# Patient Record
Sex: MALE | Race: OTHER | HISPANIC OR LATINO
[De-identification: names, ages, dates, MRNs, and addresses within clinical notes are randomized per-mention and may not be internally consistent; named-entity substitution may affect disease eponyms.]

---

## 2019-11-08 PROBLEM — Z00.00 ENCOUNTER FOR PREVENTIVE HEALTH EXAMINATION: Status: ACTIVE | Noted: 2019-11-08

## 2019-11-15 ENCOUNTER — APPOINTMENT (OUTPATIENT)
Dept: SURGERY | Facility: CLINIC | Age: 49
End: 2019-11-15
Payer: COMMERCIAL

## 2019-11-15 VITALS
TEMPERATURE: 96.8 F | HEART RATE: 84 BPM | BODY MASS INDEX: 31.18 KG/M2 | SYSTOLIC BLOOD PRESSURE: 143 MMHG | WEIGHT: 194 LBS | OXYGEN SATURATION: 98 % | DIASTOLIC BLOOD PRESSURE: 93 MMHG | HEIGHT: 66 IN

## 2019-11-15 PROCEDURE — 99203 OFFICE O/P NEW LOW 30 MIN: CPT

## 2019-11-15 NOTE — HISTORY OF PRESENT ILLNESS
[de-identified] : 50 y/o M with Hx of arthritis, HLD and HTN with PSHx of deviated septum repair, presents here for initial evaluation of a lipoma. He reports here feeling well. He states that there is a lipoma to his Right side found during an US. He denies any pain associated with the lump and he is unsure if it has grown in size. He has noticed it for more than a year now. He states that he only experiences discomfort when he is laying on his right side

## 2019-11-15 NOTE — ADDENDUM
[FreeTextEntry1] : Documented by Bridget Cosme acting as a scribe for Dr. Richard Paiz on 11/15/2019\par

## 2019-11-15 NOTE — ASSESSMENT
[FreeTextEntry1] : 48 y/o M with Hx of arthritis, HLD and HTN with PSHx of deviated septum repair, with lipoma to right flank. Patient was recommended with excision of lipoma. The surgical approach, risks, benefits, and alternatives to the proposed procedure were discussed with the patient and all questions were answered to their satisfaction. Pre and Post-operative instructions were provided to the patient. Patient understands and agrees to undergo the proposed procedure. Will schedule for this. \par

## 2019-11-15 NOTE — END OF VISIT
[FreeTextEntry3] : All medical record entries made by the Scribe were at my, Dr. Paiz's direction and personally dictated by me on 11/15/2019  I have reviewed the chart and agree that the record accurately reflects my personal performance of the history, physical exam, assessment and plan. I have also personally directed, reviewed, and agreed with the chart.\par \par